# Patient Record
Sex: FEMALE | URBAN - METROPOLITAN AREA
[De-identification: names, ages, dates, MRNs, and addresses within clinical notes are randomized per-mention and may not be internally consistent; named-entity substitution may affect disease eponyms.]

---

## 2020-03-12 ENCOUNTER — VIRTUAL VISIT (OUTPATIENT)
Dept: FAMILY MEDICINE | Facility: OTHER | Age: 30
End: 2020-03-12

## 2020-03-17 NOTE — PROGRESS NOTES
"Date: 2020 16:01:14  Clinician: Don Jhaveri  Clinician NPI: [id number]  Patient: Amberly Pederson  Patient : 1990  Patient Address: [address], [place] [address]  Patient Phone: [phone]  Visit Protocol: URI  Patient Summary:  Amberly is a 29 year old ( : 1990 ) female who initiated a Visit for cold, sinus infection, or influenza. When asked the question \"Please sign me up to receive news, health information and promotions from [employer].\", Amberly responded \"No\".    Amberly states her symptoms started gradually 3-6 days ago.   Her symptoms consist of rhinitis, malaise, a cough, a headache, and myalgia. She is experiencing mild difficulty breathing with activities but can speak normally in full sentences. Amberly also feels feverish but was unable to measure her temperature.   Symptom details     Nasal secretions: The color of her mucus is yellow.    Cough: Amberly coughs a few times an hour and her cough is not more bothersome at night. Phlegm does not come into her throat when she coughs. She does not believe her cough is caused by post-nasal drip.     Headache: She states the headache is moderate (4-6 on a 10 point pain scale).      Amberly denies having wheezing, ear pain, sore throat, nasal congestion, teeth pain, enlarged lymph nodes, chills, and facial pain or pressure. She also denies double sickening (worsening symptoms after initial improvement), taking antibiotic medication for the symptoms, having recent facial or sinus surgery in the past 60 days, and having a sinus infection within the past year.   Precipitating events  She has not recently been exposed to someone with influenza. Amberly has been in close contact with the following high risk individuals: adults 65 or older.   Pertinent COVID-19 (Coronavirus) information  Amberly has not traveled internationally in the last 14 days before the start of her symptoms.   Amberly has not had close contact with a " laboratory confirmed positive COVID-19 patient within 14 days of symptom onset.   Pertinent medical history  Amberly does not get yeast infections when she takes antibiotics.   Amberly needs a return to work/school note.   Weight: 115 lbs   Amberly does not smoke or use smokeless tobacco.   She denies pregnancy and denies breastfeeding. She is currently menstruating.   Additional information as reported by the patient (free text): My wife just returned from 2 weeks involving long flights between [place] and [place] and was accompanied by many [place] passengers. She has been exhibiting symptoms for the past 10 days.   Weight: 115 lbs    MEDICATIONS: gabapentin oral, Flovent HFA inhalation, Ventolin HFA inhalation, Effexor XR oral, trazodone oral, ALLERGIES: NKDA  Clinician Response:  Dear Amberly,  Based on the information provided, you have a viral upper respiratory infection, otherwise known as a cold. Symptoms vary from person to person, but can include sneezing, coughing, a runny nose, sore throat, and headache and range from mild to severe.  Unfortunately, there are no medications that can cure a cold, so treatment is focused on controlling symptoms as much as possible. Most people gradually feel better until symptoms are gone in 1-2 weeks.  Medication information  Because you have a viral infection, antibiotics will not help you get better. Treating a viral infection with antibiotics could actually make you feel worse.  Self care  The following tips will keep you as comfortable as possible while you recover:     Rest    Drink plenty of water and other liquids    Take a hot shower to loosen congestion    Take a spoonful of honey to reduce your cough     When to seek care  Please be seen in a clinic or urgent care if new symptoms develop, or symptoms become worse.  Additional treatment plan   Dear Amberly,  Based on the information you have provided, it does not appear you need Coronavirus (COVID-19)  testing.   At this time, we recommend testing primarily for those people who have symptoms of cough and fever and have either traveled to a known area of infection or have been exposed to someone with laboratory confirmed Coronavirus by close contact.   Coronavirus - General Information:   The coronavirus infection starts within 14 days of an exposure.  Symptoms are those of a respiratory infection (such as fever, cough).   If you have not had symptoms by day 15, you should be considered uninfected by coronavirus.   Coronavirus - Symptoms:    The coronavirus can cause a respiratory illness, such as bronchitis or pneumonia.  The most common symptoms are: cough, fever, and shortness of breath.   Other symptoms are: body aches, chills, diarrhea, fatigue, headache, runny nose, and sore throat   Coronavirus - Exposure Risk Factors:   Exposure to a person who has been diagnosed with coronavirus.  Travel from an area with recent local transmission of coronavirus.  The [facility] (www.cdc.gov) has the most up-to-date list of where the coronavirus outbreak is occurring.   Coronavirus - Spreading:    The virus likely spreads through respiratory droplets produced when a person coughs or sneezes. These respiratory droplets can travel approximately 6 feet and can remain on surfaces. Common disinfectants will kill the virus.  The CDC currently does not recommend healthy people wear masks.   Coronavirus - Protect Yourself:    Avoid close contact with people known to have this new coronavirus infection.  Wash hands often with soap and water or alcohol-based hand .  Avoid touching the eyes, nose or mouth.   Thank you for limiting contact with others, wearing a simple mask to cover your cough, practice good hand hygiene habits and accessing our [employer] services where possible to limit the spread of this virus.  For more information about COVID19 and options for caring for yourself at home, please visit the CDC website at  https://www.cdc.gov/coronavirus/2019-ncov/about/steps-when-sick.html   For more options for care at [facility], please visit our website at [email or web address]     Diagnosis: Cough  Diagnosis ICD: R05

## 2020-03-17 NOTE — PROGRESS NOTES
"Date: 2020 16:34:12  Clinician: Castro Monae  Clinician NPI: [id number]  Patient: Amberly Pederson  Patient : 1990  Patient Address: [address], [place] [address]  Patient Phone: [phone]  Visit Protocol: URI  Patient Summary:  Amberly is a 29 year old ( : 1990 ) female who initiated a Visit for COVID-19 (Coronavirus) evaluation and screening. When asked the question \"Please sign me up to receive news, health information and promotions from [employer].\", Amberly responded \"No\".    Amberly states her symptoms started gradually 3-6 days ago.   Her symptoms consist of rhinitis, malaise, a cough, a headache, chills, and myalgia. She is experiencing mild difficulty breathing with activities but can speak normally in full sentences. Amberly also feels feverish but was unable to measure her temperature.   Symptom details     Nasal secretions: The color of her mucus is yellow.    Cough: Amberly coughs a few times an hour and her cough is not more bothersome at night. Phlegm does not come into her throat when she coughs. She does not believe her cough is caused by post-nasal drip.     Headache: She states the headache is severe (7-9 on a 10 point pain scale).      Amberly denies having wheezing, ear pain, sore throat, nasal congestion, teeth pain, enlarged lymph nodes, and facial pain or pressure. She also denies double sickening (worsening symptoms after initial improvement), taking antibiotic medication for the symptoms, having recent facial or sinus surgery in the past 60 days, and having a sinus infection within the past year.   Precipitating events  She has not recently been exposed to someone with influenza. Amberly has been in close contact with the following high risk individuals: adults 65 or older.   Pertinent COVID-19 (Coronavirus) information  Amberly has not traveled internationally in the last 14 days before the start of her symptoms.   Amberly has not had close contact " with a laboratory confirmed positive COVID-19 patient within 14 days of symptom onset.   Pertinent medical history  Amberly does not get yeast infections when she takes antibiotics.   Amberly needs a return to work/school note.   Weight: 115 lbs   Amberly does not smoke or use smokeless tobacco.   She denies pregnancy and denies breastfeeding. She is currently menstruating.   Additional information as reported by the patient (free text): My wife has had a great deal of exposure during her travels through [place] and [place] over the last 2 weeks. She is now home and exhibiting symptoms.   Weight: 115 lbs    MEDICATIONS: gabapentin oral, Flovent HFA inhalation, Ventolin HFA inhalation, Effexor XR oral, trazodone oral, ALLERGIES: NKDA  Clinician Response:  Dear Amberly,  Based on the information provided, you have an influenza-like illness. This is an infection that has the same symptoms of the flu, but the specific virus is not known. Lab testing would be required to confirm the flu virus, but this is often not necessary because the treatment will be the same no matter what is causing your symptoms.  Your symptoms should improve gradually over the next week.  Medication information  The CDC recommends treatment for flu only if antiviral medications can be started within 48 hours of the first flu symptoms or if you fall into one of the high-risk groups that are more likely to get flu complications.  Since you do not meet guidelines for treatment with antiviral medications, antiviral treatment is not recommended for you. Treatment focuses on controlling your symptoms.  Unless you are allergic to the over-the-counter medication(s) below, I recommend using:     Ibuprofen (Advil or store brand) 200 mg oral tablet. Take 1-3 tablets (200-600 mg) by mouth every 8 hours to help with the discomfort. Make sure to take the ibuprofen with food. Do not exceed 2400 mg in 24 hours.   Over-the-counter medications do not require a  prescription. Ask the pharmacist if you have any questions.  Self care  The following tips will keep you as comfortable as possible while you recover:     Rest    Drink plenty of water and other liquids    Take a hot shower to loosen congestion    Take a spoonful of honey to reduce your cough     If you have a fever, stay home until your temperature has returned to normal for 24 hours and you feel well enough for daily activities. And of course, wash your hands often to prevent spreading the flu and other illnesses. However, the best way to prevent the flu is to get a flu shot before each flu season.  When to seek care  Please be seen in a clinic or urgent care if new symptoms develop, or symptoms become worse.  Additional treatment plan   Dear Amberly,  Based on the information you have provided, it does not appear you need Coronavirus (COVID-19) testing.   At this time, we recommend testing primarily for those people who have symptoms of cough and fever and have either traveled to a known area of infection or have been exposed to someone with laboratory confirmed Coronavirus by close contact. If your wife tests positive for COVID then we could test you.&nbsp;  For more information about COVID19 and options for caring for yourself at home, please visit the CDC website at https://www.cdc.gov/coronavirus/2019-ncov/about/steps-when-sick.html   For more options for care at [facility], please visit our website at [email or web address]     Diagnosis: Cough  Diagnosis ICD: R05